# Patient Record
Sex: FEMALE | Race: WHITE | HISPANIC OR LATINO | URBAN - METROPOLITAN AREA
[De-identification: names, ages, dates, MRNs, and addresses within clinical notes are randomized per-mention and may not be internally consistent; named-entity substitution may affect disease eponyms.]

---
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
## 2021-08-11 ENCOUNTER — TRANSCRIPTION ENCOUNTER (OUTPATIENT)
Age: 21
End: 2021-08-11

## 2021-09-02 PROBLEM — K20.0 EOSINOPHILIC ESOPHAGITIS: Chronic | Status: ACTIVE | Noted: 2019-07-05

## 2021-10-11 ENCOUNTER — APPOINTMENT (OUTPATIENT)
Dept: PEDIATRIC ALLERGY IMMUNOLOGY | Facility: CLINIC | Age: 21
End: 2021-10-11
Payer: COMMERCIAL

## 2021-10-11 DIAGNOSIS — Z91.018 ALLERGY TO OTHER FOODS: ICD-10-CM

## 2021-10-11 DIAGNOSIS — K21.9 GASTRO-ESOPHAGEAL REFLUX DISEASE W/OUT ESOPHAGITIS: ICD-10-CM

## 2021-10-11 DIAGNOSIS — Z91.010 ALLERGY TO PEANUTS: ICD-10-CM

## 2021-10-11 DIAGNOSIS — K20.0 EOSINOPHILIC ESOPHAGITIS: ICD-10-CM

## 2021-10-11 DIAGNOSIS — Z23 ENCOUNTER FOR IMMUNIZATION: ICD-10-CM

## 2021-10-11 PROCEDURE — 90686 IIV4 VACC NO PRSV 0.5 ML IM: CPT

## 2021-10-11 PROCEDURE — 95004 PERQ TESTS W/ALRGNC XTRCS: CPT

## 2021-10-11 PROCEDURE — 99204 OFFICE O/P NEW MOD 45 MIN: CPT | Mod: 25

## 2021-10-11 PROCEDURE — 90471 IMMUNIZATION ADMIN: CPT

## 2021-10-11 NOTE — REVIEW OF SYSTEMS
[Rhinorrhea] : rhinorrhea [Nasal Congestion] : nasal congestion [Post Nasal Drip] : post nasal drip [Sneezing] : sneezing [Pain On Swallowing] : pain on swallowing [Heartburn] : heartburn [Vomiting] : vomiting [Abdominal Pain] : abdominal pain [Nl] : Integumentary

## 2021-10-11 NOTE — HISTORY OF PRESENT ILLNESS
[de-identified] : 21 yr old with long standing hisotyr of EoE with poor complinace with dietary measures and use of PPI - last endoscopy was 7/19 which showed visible signs of GERD and many Eos on Bx.  Pt also has hsitoyr of ??? known reaction to PN, TN, and sesame and avoids these food strictly.\par \par Recently she has been better on high dose Dexilant 60 mg qd and no H2 blocker. We had previously spoke about swallowed ICS but pt never started.  She does consume other EoE foods including wheat, milk, egg and?? soy and ?? shellfish but avoids fish\par She does notice some immediate complaints of GERD like symptoms with milk ingestion - mostly cheese and some abdominal pain and would like to consider a CM and/OR EW free diet in addition to PN/TN but does not think she can be wheat free. \par \par there are no immediate plans to repeat endoscopy or Bx - GI feels she is not compliant with meds or dietary avoidance and does not want to bother. \par \par No significant AR or asthma

## 2021-10-11 NOTE — PHYSICAL EXAM
[Alert] : alert [Well Nourished] : well nourished [No Discharge] : no discharge [Normal TMs] : both tympanic membranes were normal [No Thrush] : no thrush [No Neck Mass] : no neck mass was observed [Normal Rate and Effort] : normal respiratory rhythm and effort [Normal Rate] : heart rate was normal  [Skin Intact] : skin intact  [Boggy Nasal Turbinates] : no boggy and/or pale nasal turbinates [Posterior Pharyngeal Cobblestoning] : no posterior pharyngeal cobblestoning [Wheezing] : no wheezing was heard

## 2021-10-11 NOTE — SOCIAL HISTORY
[Radiator/Baseboard] : heating provided by radiator(s)/baseboard(s) [Dust Mite Covers] : does not have dust mite covers [Feather Pillows] : does not have feather pillows [Bedroom] : not in the bedroom [de-identified] : Live in apt in [facility] while at school [de-identified] : Dog at home which she sees when visiting

## 2021-10-11 NOTE — REASON FOR VISIT
[Initial Evaluation] : an initial evaluation of [Allergy Evaluation/ Skin Testing] : allergy evaluation and or skin testing [To Food] : allergy to food [Parent] : parent

## 2021-10-11 NOTE — ASSESSMENT
[FreeTextEntry1] : 21 yr old with ?? known immediate reactions to PN, TN and sesame and increase GI complaints with CM along with underlying known EoE last Bx 2019 with persistent complaints of some dysphagia, GERD\par \par Some decrease complaints with chronic use of PPI but not complete resolution\par \par Skin test today - \par \par Negative to milk, oat, wheat, EW and very small to soy\par \par Positive to soy, large positive to peanut, most tree nuts - smaller to cashew and pistachio\par \par At this point pt has no interest in food challenge but can be considered to sesame if interested after ImmunoCaps are done\par \par Strongly encourage pt to carry Epi Pen and continue PN, TN free diet , sesame free diet and to try CM free diet as a way to better control EoE symptoms\par Pt is also to schedule repeat endoscopy and Bx with GI after being better with diet and PPi\par \par Total MD time spent on this encounter was 50 minutes.  This includes time devoted to preparing to see the patient with review of previous medical record, obtaining medical history, performing physical exam, counseling and patient education with patient and family, ordering medications and lab studies, documentation in the medical record and coordination of care.\par

## 2021-11-23 ENCOUNTER — TRANSCRIPTION ENCOUNTER (OUTPATIENT)
Age: 21
End: 2021-11-23

## 2022-06-02 ENCOUNTER — APPOINTMENT (OUTPATIENT)
Dept: ORTHOPEDIC SURGERY | Facility: CLINIC | Age: 22
End: 2022-06-02

## 2022-06-07 ENCOUNTER — LABORATORY RESULT (OUTPATIENT)
Age: 22
End: 2022-06-07

## 2022-06-09 LAB
ALMOND IGE QN: 1.21 KUA/L
BRAZIL NUT IGE QN: <0.1 KUA/L
CASHEW NUT IGE QN: 0.1 KUA/L
COCONUT IGE QN: 1.01 KUA/L
COCONUT IGE QN: 2
DEPRECATED ALMOND IGE RAST QL: 2
DEPRECATED BRAZIL NUT IGE RAST QL: 0
DEPRECATED CASHEW NUT IGE RAST QL: NORMAL
DEPRECATED HAZELNUT IGE RAST QL: 4
DEPRECATED MACADAMIA IGE RAST QL: 2
DEPRECATED PEANUT IGE RAST QL: 3
DEPRECATED PECAN/HICK TREE IGE RAST QL: 2
DEPRECATED PINE NUT IGE RAST QL: NORMAL
DEPRECATED PISTACHIO IGE RAST QL: 0.59 KUA/L
DEPRECATED SESAME SEED IGE RAST QL: 2
DEPRECATED WALNUT IGE RAST QL: 2
E ANA O3 STORAGE PROTEIN CASHEW (F443) CLASS: 0
E ANA O3 STORAGE PROTEIN CASHEW (F443) CONC: <0.1 KUA/L
HAZELNUT IGE QN: 32.2 KUA/L
MACADAMIA IGE QN: 1.03 KUA/L
PEANUT IGE QN: 6.09 KUA/L
PECAN/HICK TREE IGE QN: 0.97 KUA/L
PINE NUT IGE QN: 0.24 KUA/L
PISTACHIO IGE QN: 1
R COR A1 PR-10 HAZELNUT (F428) CLASS: 4
R COR A1 PR-10 HAZELNUT (F428) CONC: 37 KUA/L
R COR A14 HAZELNUT (F439) CLASS: 0
R COR A14 HAZELNUT (F439) CONC: <0.1 KUA/L
R COR A8 LTP HAZELNUT (F425) CLASS: 0
R COR A8 LTP HAZELNUT (F425) CONC: <0.1 KUA/L
R COR A9 HAZELNUT (F440) CLASS: 2
R COR A9 HAZELNUT (F440) CONC: 1.15 KUA/L
R JUG R1 STORAGE PROTEIN WALNUT (F441) CLASS: 0
R JUG R1 STORAGE PROTEIN WALNUT (F441) CONC: <0.1 KUA/L
R JUG R3 LPT WALNUT (F442) CLASS: ABNORMAL
R JUG R3 LPT WALNUT (F442) CONC: 0.23 KUA/L
RBER E1 STORAGE PROTEIN BRAZIL (F354) CL: 0
RBER E1 STORAGE PROTEIN BRAZIL (F354) CONC: <0.1 KUA/L
SESAME SEED IGE QN: 2.28 KUA/L
WALNUT IGE QN: 1.82 KUA/L

## 2022-06-12 ENCOUNTER — NON-APPOINTMENT (OUTPATIENT)
Age: 22
End: 2022-06-12

## 2022-06-12 LAB
PEANUT (RARA H) 1 IGE QN: <0.1 KUA/L
PEANUT (RARA H) 2 IGE QN: 4.38 KUA/L
PEANUT (RARA H) 3 IGE QN: <0.1 KUA/L
PEANUT (RARA H) 6 IGE QN: 2.1 KUA/L
PEANUT (RARA H) 8 IGE QN: 12.7 KUA/L
PEANUT (RARA H) 9 IGE QN: 0.41 KUA/L
RARA H 6 STORAGE PROTEIN (F447) CLASS: 2
RARA H1 STORAGE PROTEIN (F422) CLASS: 0
RARA H2 STORAGE PROTEIN (F423) CLASS: 3
RARA H3 STORAGE PROTEIN (F424) CLASS: 0
RARA H8 PR-10 PROTEIN (F352) CLASS: 3
RARA H9 LIPID TRANSFERTP (F427) CLASS: 1

## 2022-07-12 ENCOUNTER — APPOINTMENT (OUTPATIENT)
Dept: ORTHOPEDIC SURGERY | Facility: CLINIC | Age: 22
End: 2022-07-12

## 2022-07-12 VITALS — BODY MASS INDEX: 21.68 KG/M2 | HEIGHT: 64 IN | WEIGHT: 127 LBS

## 2022-07-12 DIAGNOSIS — M71.349 OTHER BURSAL CYST, UNSPECIFIED HAND: ICD-10-CM

## 2022-07-12 PROCEDURE — 73130 X-RAY EXAM OF HAND: CPT | Mod: RT

## 2022-07-12 PROCEDURE — 99203 OFFICE O/P NEW LOW 30 MIN: CPT | Mod: 25

## 2022-07-12 PROCEDURE — 20612 ASPIRATE/INJ GANGLION CYST: CPT | Mod: RT

## 2022-07-12 NOTE — PHYSICAL EXAM
[de-identified] : Patient is WDWN, alert, and in no acute distress. Breathing is unlabored. She is grossly oriented to person, place, and time.\par \par She is accompanied by her father today.\par \par Right Hand:\par There is a well circumscribed mass noted dorsally to the hand.\par The mass is firm and mobile.\par The cyst is nontender to palpation and does not fluctuate in size.\par Full wrist motion into flexion and extension.\par Full digital motion present into flexion/extension.\par Sensation is intact distally to the digits.  [de-identified] : AP, lateral and oblique views of the RIGHT hand were obtained today and revealed no abnormalities. No acute fracture. No dislocation. Cartilage spaces are maintained.

## 2022-07-12 NOTE — DISCUSSION/SUMMARY
[FreeTextEntry1] : The underlying pathophysiology was reviewed with the patient. XR films were reviewed with the patient. Discussed at length the nature of the patient’s condition. The right hand/wrist symptoms appear secondary to dorsal ganglion cyst.\par \par The underlying pathophysiology was reviewed with the patient. Treatment options were discussed including; observation, aspiration, and surgical excision. \par At this point, I recommend an aspiration. Under sterile precautions, .5 cc of clear gel fluid was aspirated from the dorsal aspect of the RIGHT wrist The patient was informed that there is a probability that the cyst will refill and may warrant another aspiration. She may return to this office if this occurs.\par \par All questions answered, understanding verbalized. Patient in agreement with plan of care. Follow up as needed.

## 2022-07-12 NOTE — END OF VISIT
[FreeTextEntry3] : All medical record entries made by the Scribe were at my,  Dr. Kevin Hathaway MD., direction and personally dictated by me on 07/12/2022. I have personally reviewed the chart and agree that the record accurately reflects my personal performance of the history, physical exam, assessment and plan.

## 2022-07-12 NOTE — ADDENDUM
[FreeTextEntry1] : I, Merly Morales wrote this note acting as a scribe for Dr. Kevin Hathaway on Jul 12, 2022.

## 2022-07-12 NOTE — HISTORY OF PRESENT ILLNESS
[Right] : right hand dominant [FreeTextEntry1] : Patient is a 22 year old female who presents with complaints of a right hand dorsal cyst, which she states she first noticed a few months ago. She elizabeth injury as well as pain with regard to the cyst. It has increased in size as of late. It is nontender.

## 2023-04-04 ENCOUNTER — NON-APPOINTMENT (OUTPATIENT)
Age: 23
End: 2023-04-04

## 2024-01-18 NOTE — ED PEDIATRIC NURSE NOTE - CINV DISCH TEACH PARTICIP
I spoke with pt cousin- on HIPAA. I relayed message per [facility]. Cousin said that pt was listening while I spoke with her.   Patient/Parent(s)

## 2024-05-22 ENCOUNTER — EMERGENCY (EMERGENCY)
Facility: HOSPITAL | Age: 24
LOS: 1 days | Discharge: HOME | End: 2024-05-22
Attending: EMERGENCY MEDICINE
Payer: COMMERCIAL

## 2024-05-22 VITALS
OXYGEN SATURATION: 97 % | RESPIRATION RATE: 19 BRPM | SYSTOLIC BLOOD PRESSURE: 132 MMHG | DIASTOLIC BLOOD PRESSURE: 83 MMHG | HEART RATE: 89 BPM | WEIGHT: 130.07 LBS | HEIGHT: 64 IN | TEMPERATURE: 98 F

## 2024-05-22 DIAGNOSIS — Z98.890 OTHER SPECIFIED POSTPROCEDURAL STATES: Chronic | ICD-10-CM

## 2024-05-22 PROCEDURE — 99284 EMERGENCY DEPT VISIT MOD MDM: CPT

## 2024-05-22 PROCEDURE — 99283 EMERGENCY DEPT VISIT LOW MDM: CPT | Mod: 25

## 2024-05-22 PROCEDURE — 90471 IMMUNIZATION ADMIN: CPT

## 2024-05-22 PROCEDURE — 90715 TDAP VACCINE 7 YRS/> IM: CPT

## 2024-05-22 RX ORDER — ACETAMINOPHEN 500 MG
975 TABLET ORAL ONCE
Refills: 0 | Status: COMPLETED | OUTPATIENT
Start: 2024-05-22 | End: 2024-05-22

## 2024-05-22 RX ORDER — TETANUS TOXOID, REDUCED DIPHTHERIA TOXOID AND ACELLULAR PERTUSSIS VACCINE, ADSORBED 5; 2.5; 8; 8; 2.5 [IU]/.5ML; [IU]/.5ML; UG/.5ML; UG/.5ML; UG/.5ML
0.5 SUSPENSION INTRAMUSCULAR ONCE
Refills: 0 | Status: COMPLETED | OUTPATIENT
Start: 2024-05-22 | End: 2024-05-22

## 2024-05-22 RX ADMIN — TETANUS TOXOID, REDUCED DIPHTHERIA TOXOID AND ACELLULAR PERTUSSIS VACCINE, ADSORBED 0.5 MILLILITER(S): 5; 2.5; 8; 8; 2.5 SUSPENSION INTRAMUSCULAR at 03:03

## 2024-05-22 RX ADMIN — Medication 975 MILLIGRAM(S): at 03:09

## 2024-05-22 RX ADMIN — Medication 1 TABLET(S): at 04:14

## 2024-05-22 NOTE — ED PROVIDER NOTE - PHYSICAL EXAMINATION
GENERAL: no acute distress, non-toxic appearing  HEAD: normocephalic, atraumatic  HEENT: PERRLA, EOMI, normal conjunctiva  CARDIAC: regular rate and rhythm  PULM: clear to ascultation bilaterally  GI: abdomen nondistended, soft, nontender  NEURO: alert and oriented x 3, normal speech, no gross neurologic deficit  MSK: +ecchymosis to right posterior thigh, +puncture wound with mild bleeding, small abrasion approx 1 cm  SKIN: no visible rashes, dry, well-perfused  PSYCH: appropriate mood and affect

## 2024-05-22 NOTE — ED PROVIDER NOTE - PATIENT PORTAL LINK FT
You can access the FollowMyHealth Patient Portal offered by [facility] by registering at the following website: [email or web address]. By joining [employer]’s FollowMyHealth portal, you will also be able to view your health information using other applications (apps) compatible with our system.

## 2024-05-22 NOTE — ED ADULT NURSE NOTE - OBJECTIVE STATEMENT
23y female presents s/p dog bite. Pt states she was playing with her pitbull dog and got bit by the dog on her right buttock. Pt states bite is pinpoint size from dog tooth and is oozing blood which is covered in a dressing. Pt states dog is up to date on shots.

## 2024-05-22 NOTE — ED PROVIDER NOTE - CLINICAL SUMMARY MEDICAL DECISION MAKING FREE TEXT BOX
Dog is known to family, well vaccinated. Patient well-appearing, moving extremities, no active bleeding. Will give tetanus, augmentin, clean wound.  Leading controlled. Dog is known to family, well vaccinated. Patient well-appearing, moving extremities, no active bleeding. Will give tetanus, augmentin, clean wound.  Leading controlled.  Gretchen: 23 year old female with dog bite to buttock.  utd on vaccines. will update patient tetanus, augmentin. PE: alert, nad, buttock: + ecchymosis to R posterior thigh, + puncture wound with mild bleeding to R buttock, small abrasion approximately 1 cm.  alert and orietnd, moving laura xtremiteis. will clean, steri strip, abx, reassess.

## 2024-05-22 NOTE — ED PROVIDER NOTE - OBJECTIVE STATEMENT
Patient is a 23-year-old presenting with dog bite to buttocks.  Patient states she was at home, playing with the dog, and bit her bottom.  Dog is vaccinated.

## 2024-05-22 NOTE — ED PROVIDER NOTE - NSFOLLOWUPINSTRUCTIONS_ED_ALL_ED_FT
You were seen in the ED for a dog bite.  Change bandage twice a day.  Take Tylenol and/or Ibuprofen every 4-6 hours as needed for pain.  Take antibiotics as prescribed to your pharmacy.   ***Return to the ED if you have any new or worsening symptoms such as redness, fevers, or any other concerning symptoms***

## 2024-05-22 NOTE — ED ADULT NURSE NOTE - SUICIDE SCREENING DEPRESSION
Problem: Adult Inpatient Plan of Care  Goal: Plan of Care Review  Recent Flowsheet Documentation  Taken 11/11/2020 1145 by Gertrude Wang OT  Progress: improving  Outcome Summary: Pt  simulated donning pants/undergarments with min assist this date, supv sitting to don/doff socks. Pt CGA to stand and simulate CGa/min assist with shower txfrs. Pt would benefit from IRF at discharge.      Negative

## 2024-05-22 NOTE — ED PROVIDER NOTE - PROGRESS NOTE DETAILS
Nirmala Cruz MD PGY3: Will send home with augmentin. Wound was cleansed with normal saline. Bleeding controlled with gauze and tape.